# Patient Record
Sex: FEMALE | Race: WHITE | Employment: OTHER | URBAN - METROPOLITAN AREA
[De-identification: names, ages, dates, MRNs, and addresses within clinical notes are randomized per-mention and may not be internally consistent; named-entity substitution may affect disease eponyms.]

---

## 2020-03-02 ENCOUNTER — HOSPITAL ENCOUNTER (EMERGENCY)
Facility: CLINIC | Age: 45
Discharge: HOME | End: 2020-03-03
Attending: EMERGENCY MEDICINE | Admitting: EMERGENCY MEDICINE
Payer: COMMERCIAL

## 2020-03-02 DIAGNOSIS — R07.9 CHEST PAIN, UNSPECIFIED TYPE: ICD-10-CM

## 2020-03-02 PROCEDURE — 84484 ASSAY OF TROPONIN QUANT: CPT | Performed by: EMERGENCY MEDICINE

## 2020-03-02 PROCEDURE — 85025 COMPLETE CBC W/AUTO DIFF WBC: CPT | Performed by: EMERGENCY MEDICINE

## 2020-03-02 PROCEDURE — 80053 COMPREHEN METABOLIC PANEL: CPT | Performed by: EMERGENCY MEDICINE

## 2020-03-02 PROCEDURE — 99285 EMERGENCY DEPT VISIT HI MDM: CPT

## 2020-03-02 PROCEDURE — 93005 ELECTROCARDIOGRAM TRACING: CPT

## 2020-03-02 ASSESSMENT — MIFFLIN-ST. JEOR: SCORE: 1315.4

## 2020-03-02 NOTE — ED AVS SNAPSHOT
Emergency Department  [address] [place] [id number]  Phone:  [phone]  Fax:  [phone]                                    Karie Evans   MRN: [id number]    Department:   Emergency Department   Date of Visit:  3/2/2020           After Visit Summary Signature Page    I have received my discharge instructions, and my questions have been answered. I have discussed any challenges I see with this plan with the nurse or doctor.    ..........................................................................................................................................  Patient/Patient Representative Signature      ..........................................................................................................................................  Patient Representative Print Name and Relationship to Patient    ..................................................               ................................................  Date                                   Time    ..........................................................................................................................................  Reviewed by Signature/Title    ...................................................              ..............................................  Date                                               Time          22EPIC Rev 08/18

## 2020-03-03 ENCOUNTER — APPOINTMENT (OUTPATIENT)
Dept: GENERAL RADIOLOGY | Facility: CLINIC | Age: 45
End: 2020-03-03
Attending: EMERGENCY MEDICINE
Payer: COMMERCIAL

## 2020-03-03 VITALS
HEIGHT: 64 IN | WEIGHT: 150 LBS | BODY MASS INDEX: 25.61 KG/M2 | RESPIRATION RATE: 16 BRPM | SYSTOLIC BLOOD PRESSURE: 117 MMHG | OXYGEN SATURATION: 96 % | DIASTOLIC BLOOD PRESSURE: 70 MMHG | TEMPERATURE: 98.2 F

## 2020-03-03 LAB
ALBUMIN SERPL-MCNC: 3.8 G/DL (ref 3.4–5)
ALP SERPL-CCNC: 104 U/L (ref 40–150)
ALT SERPL W P-5'-P-CCNC: 45 U/L (ref 0–50)
ANION GAP SERPL CALCULATED.3IONS-SCNC: 4 MMOL/L (ref 3–14)
AST SERPL W P-5'-P-CCNC: 21 U/L (ref 0–45)
BASOPHILS # BLD AUTO: 0 10E9/L (ref 0–0.2)
BASOPHILS NFR BLD AUTO: 0.4 %
BILIRUB SERPL-MCNC: 0.3 MG/DL (ref 0.2–1.3)
BUN SERPL-MCNC: 12 MG/DL (ref 7–30)
CALCIUM SERPL-MCNC: 8.5 MG/DL (ref 8.5–10.1)
CHLORIDE SERPL-SCNC: 104 MMOL/L (ref 94–109)
CO2 SERPL-SCNC: 28 MMOL/L (ref 20–32)
CREAT SERPL-MCNC: 0.63 MG/DL (ref 0.52–1.04)
DIFFERENTIAL METHOD BLD: NORMAL
EOSINOPHIL # BLD AUTO: 0.2 10E9/L (ref 0–0.7)
EOSINOPHIL NFR BLD AUTO: 1.8 %
ERYTHROCYTE [DISTWIDTH] IN BLOOD BY AUTOMATED COUNT: 13.8 % (ref 10–15)
GFR SERPL CREATININE-BSD FRML MDRD: >90 ML/MIN/{1.73_M2}
GLUCOSE SERPL-MCNC: 82 MG/DL (ref 70–99)
HCT VFR BLD AUTO: 38.7 % (ref 35–47)
HGB BLD-MCNC: 12.8 G/DL (ref 11.7–15.7)
IMM GRANULOCYTES # BLD: 0 10E9/L (ref 0–0.4)
IMM GRANULOCYTES NFR BLD: 0.3 %
INTERPRETATION ECG - MUSE: NORMAL
LYMPHOCYTES # BLD AUTO: 3.3 10E9/L (ref 0.8–5.3)
LYMPHOCYTES NFR BLD AUTO: 30.4 %
MCH RBC QN AUTO: 29.2 PG (ref 26.5–33)
MCHC RBC AUTO-ENTMCNC: 33.1 G/DL (ref 31.5–36.5)
MCV RBC AUTO: 88 FL (ref 78–100)
MONOCYTES # BLD AUTO: 0.7 10E9/L (ref 0–1.3)
MONOCYTES NFR BLD AUTO: 6.7 %
NEUTROPHILS # BLD AUTO: 6.6 10E9/L (ref 1.6–8.3)
NEUTROPHILS NFR BLD AUTO: 60.4 %
NRBC # BLD AUTO: 0 10*3/UL
NRBC BLD AUTO-RTO: 0 /100
PLATELET # BLD AUTO: 229 10E9/L (ref 150–450)
POTASSIUM SERPL-SCNC: 3.7 MMOL/L (ref 3.4–5.3)
PROT SERPL-MCNC: 7.6 G/DL (ref 6.8–8.8)
RBC # BLD AUTO: 4.39 10E12/L (ref 3.8–5.2)
SODIUM SERPL-SCNC: 136 MMOL/L (ref 133–144)
TROPONIN I SERPL-MCNC: <0.015 UG/L (ref 0–0.04)
WBC # BLD AUTO: 10.9 10E9/L (ref 4–11)

## 2020-03-03 PROCEDURE — 71046 X-RAY EXAM CHEST 2 VIEWS: CPT

## 2020-03-03 NOTE — ED TRIAGE NOTES
Random-intermittant CP=sharp lasting and hour or two. no change with deep insp. Occasional rad. to left shoulder and jaw. Occasional SOB feeling and rapid heart feeling.

## 2020-03-03 NOTE — ED PROVIDER NOTES
"  History     Chief Complaint:  Chest pain    HPI   Karie Evans is a 45 year old female who presents with concerns for intermittent chest pain for the last 10 to 14 days.  She notes that the pain seems to come and go at random times.  It seems to be worse when she is at rest.  She describes as a sharp and stabbing pain to the left side of the chest that is often associated with having a skipped beat, either just before the pain or during the pain.  She notes that then she feels a \"soreness\" to the left chest wall that can last anywhere from a few minutes up to a few hours.  She denies it being associated with exertion.  She denies an association with food.  She notes that she is under a lot of stress and has believe that this was the issue, but after telling her family about it, they urged her to come in to be seen tonight.  She is not having any active pain at the time of my discussion with her.  She denies associated shortness of breath, lightheadedness, or diaphoresis.    Cardiac risk factors: Patient denies a family history of heart disease, history of smoking, hypertension, diabetes, or high cholesterol    Pulmonary embolism risk factors: Patient denies exogenous use of estrogen, recent surgery or immobility, lower extremity edema or asymmetry, or family history of blood clots.    Allergies:  No Known Allergies     Medications:    No current outpatient medications on file.      Past Medical History:    No past medical history on file.    There are no active problems to display for this patient.       Past Surgical History:    No past surgical history on file.     Family History:    family history is not on file.    Social History:       PCP: [facility], [facility]     Review of Systems  Pertinent positives and negatives as above.  A 10-point review of systems was performed with all other systems reviewed as negative.      Physical Exam     Patient Vitals for the past 24 hrs:   BP Temp Temp src Heart Rate " "Resp SpO2 Height Weight   03/03/20 0059 -- -- -- 89 16 96 % -- --   03/02/20 2215 117/70 98.2  F (36.8  C) Oral 93 16 100 % 1.626 m (5' 4\") 68 kg (150 lb)        Physical Exam  Eye:  Pupils are equal, round, and reactive.  Extraocular movements intact.    ENT:  No rhinorrhea.  Moist mucus membranes.  Normal tongue and tonsil.    Cardiac:  Regular rate and rhythm.  No murmurs, gallops, or rubs.    Pulmonary:  Clear to auscultation bilaterally.  No wheezes, rales, or rhonchi.    Abdomen:  Positive bowel sounds.  Abdomen is soft and non-distended, without focal tenderness.    Musculoskeletal:  Normal movement of all extremities without evidence for deficit.  Palpation of the chest wall reproduces symptoms of pain.  She has no lower extremity edema or asymmetry.    Skin:  Warm and dry without rashes.    Neurologic:  Non-focal exam without asymmetric weakness or numbness.     Psychiatric:  Normal affect with appropriate interaction with examiner.    Emergency Department Course     Imaging:      Chest XR,  PA & LAT   Final Result   IMPRESSION: Heart is normal in size. No pneumothorax. No infiltrates. No pleural fluid. Soft tissues and bony thorax unremarkable.              Laboratory:  Labs Ordered and Resulted from Time of ED Arrival Up to the Time of Departure from the ED   TROPONIN I   COMPREHENSIVE METABOLIC PANEL   CBC WITH PLATELETS DIFFERENTIAL     EKG: Normal sinus rhythm with a rate of 94.  Normal axis and normal interval.  There is no ST elevation, depression, or T wave inversion concerning for ischemia.  Normal EKG.      Impression & Plan      Medical Decision Making:  This delightful 45-year-old woman without cardiac or pulmonary embolism risk factors presents to us with complaints of having left-sided intermittent chest pain for the last 10 to 14 days.  She describes this as a sharp and stabbing pain that typically occurs while she is at rest.  The pain will typically resolve as quickly as it comes, never " lasting longer than 30 seconds.  However, she will at times then develop an aching sensation in the same area that will dissipate over period of minutes to hours.  She did notice that the pain seemed to be more persistent throughout the day today, and when she told her family about it, they convinced her to come to the hospital for evaluation.  She denies having any significant shortness of breath, lightheadedness, or diaphoresis associated with it.  Is not associated with food or with exertion.    On my exam, she appears clinically well.  Her vital signs are normal.  An EKG was obtained which does not show any evidence of significant abnormality.  A laboratory investigation was pursued which shows a negative troponin along with a normal CBC and chemistry.  She is PERC negative and therefore a d-dimer was not sent.  I have limited concerns for aortic dissection.  Her chest x-ray is clear.    I spoke with the patient at length of how to proceed.  I did offer her admission to the hospital.  However, considering that she has a low HEART score and continues to be chest pain-free, I feel she is safe for discharge home to have an outpatient stress echocardiogram.  Exact etiology of her pain is unclear.  I explained that she will need close follow-up with her primary doctor for further work-up.  She will otherwise return to us immediately with any worsening of her pain or other emergent concerns.    Diagnosis:    ICD-10-CM    1. Chest pain, unspecified type R07.9 Echo Stress Echocardiogram          3/3/2020   Trierweiler, Chad A, MD Trierweiler, Chad A, MD  03/03/20 0608

## 2020-03-06 ENCOUNTER — HOSPITAL ENCOUNTER (OUTPATIENT)
Dept: CARDIOLOGY | Facility: CLINIC | Age: 45
Discharge: HOME | End: 2020-03-06
Attending: EMERGENCY MEDICINE | Admitting: EMERGENCY MEDICINE
Payer: COMMERCIAL

## 2020-03-06 DIAGNOSIS — R07.9 CHEST PAIN, UNSPECIFIED TYPE: ICD-10-CM

## 2020-03-06 PROCEDURE — 93016 CV STRESS TEST SUPVJ ONLY: CPT | Performed by: INTERNAL MEDICINE

## 2020-03-06 PROCEDURE — 93321 DOPPLER ECHO F-UP/LMTD STD: CPT | Mod: 26 | Performed by: INTERNAL MEDICINE

## 2020-03-06 PROCEDURE — 93350 STRESS TTE ONLY: CPT | Mod: 26 | Performed by: INTERNAL MEDICINE

## 2020-03-06 PROCEDURE — 93017 CV STRESS TEST TRACING ONLY: CPT

## 2020-03-06 PROCEDURE — 93325 DOPPLER ECHO COLOR FLOW MAPG: CPT | Mod: 26 | Performed by: INTERNAL MEDICINE

## 2020-03-06 PROCEDURE — 25500064 ZZH RX 255 OP 636: Performed by: EMERGENCY MEDICINE

## 2020-03-06 PROCEDURE — 93018 CV STRESS TEST I&R ONLY: CPT | Performed by: INTERNAL MEDICINE

## 2020-03-06 RX ADMIN — HUMAN ALBUMIN MICROSPHERES AND PERFLUTREN 6 ML: 10; .22 INJECTION, SOLUTION INTRAVENOUS at 09:45
